# Patient Record
Sex: FEMALE | Race: WHITE | NOT HISPANIC OR LATINO | Employment: FULL TIME | URBAN - METROPOLITAN AREA
[De-identification: names, ages, dates, MRNs, and addresses within clinical notes are randomized per-mention and may not be internally consistent; named-entity substitution may affect disease eponyms.]

---

## 2022-08-10 ENCOUNTER — APPOINTMENT (OUTPATIENT)
Dept: RADIOLOGY | Facility: IMAGING CENTER | Age: 32
End: 2022-08-10
Attending: FAMILY MEDICINE
Payer: COMMERCIAL

## 2022-08-10 ENCOUNTER — OFFICE VISIT (OUTPATIENT)
Dept: URGENT CARE | Facility: PHYSICIAN GROUP | Age: 32
End: 2022-08-10
Payer: COMMERCIAL

## 2022-08-10 VITALS
DIASTOLIC BLOOD PRESSURE: 76 MMHG | BODY MASS INDEX: 35.98 KG/M2 | HEIGHT: 68 IN | HEART RATE: 80 BPM | SYSTOLIC BLOOD PRESSURE: 110 MMHG | RESPIRATION RATE: 16 BRPM | TEMPERATURE: 97 F | OXYGEN SATURATION: 97 % | WEIGHT: 237.4 LBS

## 2022-08-10 DIAGNOSIS — S92.415A CLOSED NONDISPLACED FRACTURE OF PROXIMAL PHALANX OF LEFT GREAT TOE, INITIAL ENCOUNTER: ICD-10-CM

## 2022-08-10 DIAGNOSIS — S99.922A INJURY OF LEFT FOOT, INITIAL ENCOUNTER: ICD-10-CM

## 2022-08-10 PROCEDURE — 99203 OFFICE O/P NEW LOW 30 MIN: CPT | Performed by: FAMILY MEDICINE

## 2022-08-10 PROCEDURE — 73630 X-RAY EXAM OF FOOT: CPT | Mod: TC,LT | Performed by: FAMILY MEDICINE

## 2022-08-10 ASSESSMENT — ENCOUNTER SYMPTOMS
ROS SKIN COMMENTS: NO ABRASION OR LACERATION
FOCAL WEAKNESS: 0
SENSORY CHANGE: 0

## 2022-08-10 NOTE — PROGRESS NOTES
"Subjective     Tari Pfeiffer is a 31 y.o. female who presents with Foot Injury (Fell downstairs this morning injured left foot, might have broken toes , hurting to walk)            Onset this morning injury to left foot.  Pain swelling and bruising great toe and second toe including MTP joints.  Unable to bear weight.  No prior injury.  No abrasion or laceration.  Minimal relief with OTC NSAID. Ice seems to be making the pain worse.  No other aggravating or alleviating factors.      Review of Systems   Musculoskeletal:         No ankle pain   Skin:         No abrasion or laceration     Neurological:  Negative for sensory change and focal weakness.            Objective     /76 (BP Location: Left arm, Patient Position: Sitting, BP Cuff Size: Adult)   Pulse 80   Temp 36.1 °C (97 °F) (Temporal)   Resp 16   Ht 1.727 m (5' 8\")   Wt 108 kg (237 lb 6.4 oz)   SpO2 97%   BMI 36.10 kg/m²      Physical Exam  Constitutional:       Appearance: Normal appearance.   Musculoskeletal:        Feet:    Skin:     General: Skin is warm and dry.   Neurological:      Mental Status: She is alert.                           Assessment & Plan     X-ray per radiology:  1.  Nondisplaced fracture of the distal aspect of the proximal phalanx of the first digit.  2.  Soft tissue swelling of the first digit.    1. Injury of left foot, initial encounter  DX-FOOT-COMPLETE 3+ LEFT      2. Closed nondisplaced fracture of proximal phalanx of left great toe, initial encounter  Referral to Sports Medicine        Differential diagnosis, natural history, supportive care, and indications for immediate follow-up discussed at length.     Walking boot placed.  Ice and NSAID as needed.  Follow-up with sports medicine for further evaluation and treatment.                 "
65

## 2022-08-10 NOTE — LETTER
August 10, 2022         Patient: Tari Pfeiffer   YOB: 1990   Date of Visit: 8/10/2022           To Whom it May Concern:    Tari Pfeiffer was seen in my clinic on 8/10/2022. Please excuse from work 8/10 through 8/16/2022.     Sincerely,           Panda Phan M.D.  Electronically Signed

## 2022-08-17 ENCOUNTER — OFFICE VISIT (OUTPATIENT)
Dept: SPORTS MEDICINE | Facility: CLINIC | Age: 32
End: 2022-08-17
Payer: COMMERCIAL

## 2022-08-17 ENCOUNTER — APPOINTMENT (OUTPATIENT)
Dept: RADIOLOGY | Facility: IMAGING CENTER | Age: 32
End: 2022-08-17
Attending: FAMILY MEDICINE
Payer: COMMERCIAL

## 2022-08-17 VITALS
TEMPERATURE: 98.2 F | HEART RATE: 60 BPM | SYSTOLIC BLOOD PRESSURE: 116 MMHG | BODY MASS INDEX: 35.92 KG/M2 | WEIGHT: 237 LBS | OXYGEN SATURATION: 98 % | HEIGHT: 68 IN | RESPIRATION RATE: 16 BRPM | DIASTOLIC BLOOD PRESSURE: 74 MMHG

## 2022-08-17 DIAGNOSIS — S92.415A CLOSED NONDISPLACED FRACTURE OF PROXIMAL PHALANX OF LEFT GREAT TOE, INITIAL ENCOUNTER: ICD-10-CM

## 2022-08-17 PROCEDURE — 73660 X-RAY EXAM OF TOE(S): CPT | Mod: TC,LT | Performed by: RADIOLOGY

## 2022-08-17 PROCEDURE — 99213 OFFICE O/P EST LOW 20 MIN: CPT | Performed by: FAMILY MEDICINE

## 2022-08-17 SDOH — HEALTH STABILITY: PHYSICAL HEALTH: ON AVERAGE, HOW MANY MINUTES DO YOU ENGAGE IN EXERCISE AT THIS LEVEL?: 40 MIN

## 2022-08-17 SDOH — ECONOMIC STABILITY: HOUSING INSECURITY
IN THE LAST 12 MONTHS, WAS THERE A TIME WHEN YOU DID NOT HAVE A STEADY PLACE TO SLEEP OR SLEPT IN A SHELTER (INCLUDING NOW)?: NO

## 2022-08-17 SDOH — ECONOMIC STABILITY: FOOD INSECURITY: WITHIN THE PAST 12 MONTHS, YOU WORRIED THAT YOUR FOOD WOULD RUN OUT BEFORE YOU GOT MONEY TO BUY MORE.: NEVER TRUE

## 2022-08-17 SDOH — ECONOMIC STABILITY: INCOME INSECURITY: HOW HARD IS IT FOR YOU TO PAY FOR THE VERY BASICS LIKE FOOD, HOUSING, MEDICAL CARE, AND HEATING?: NOT HARD AT ALL

## 2022-08-17 SDOH — ECONOMIC STABILITY: TRANSPORTATION INSECURITY
IN THE PAST 12 MONTHS, HAS THE LACK OF TRANSPORTATION KEPT YOU FROM MEDICAL APPOINTMENTS OR FROM GETTING MEDICATIONS?: NO

## 2022-08-17 SDOH — HEALTH STABILITY: PHYSICAL HEALTH: ON AVERAGE, HOW MANY DAYS PER WEEK DO YOU ENGAGE IN MODERATE TO STRENUOUS EXERCISE (LIKE A BRISK WALK)?: 4 DAYS

## 2022-08-17 SDOH — ECONOMIC STABILITY: HOUSING INSECURITY

## 2022-08-17 SDOH — ECONOMIC STABILITY: TRANSPORTATION INSECURITY
IN THE PAST 12 MONTHS, HAS LACK OF TRANSPORTATION KEPT YOU FROM MEETINGS, WORK, OR FROM GETTING THINGS NEEDED FOR DAILY LIVING?: NO

## 2022-08-17 SDOH — ECONOMIC STABILITY: TRANSPORTATION INSECURITY
IN THE PAST 12 MONTHS, HAS LACK OF RELIABLE TRANSPORTATION KEPT YOU FROM MEDICAL APPOINTMENTS, MEETINGS, WORK OR FROM GETTING THINGS NEEDED FOR DAILY LIVING?: NO

## 2022-08-17 SDOH — ECONOMIC STABILITY: INCOME INSECURITY: IN THE LAST 12 MONTHS, WAS THERE A TIME WHEN YOU WERE NOT ABLE TO PAY THE MORTGAGE OR RENT ON TIME?: NO

## 2022-08-17 SDOH — ECONOMIC STABILITY: FOOD INSECURITY: WITHIN THE PAST 12 MONTHS, THE FOOD YOU BOUGHT JUST DIDN'T LAST AND YOU DIDN'T HAVE MONEY TO GET MORE.: NEVER TRUE

## 2022-08-17 SDOH — HEALTH STABILITY: MENTAL HEALTH
STRESS IS WHEN SOMEONE FEELS TENSE, NERVOUS, ANXIOUS, OR CAN'T SLEEP AT NIGHT BECAUSE THEIR MIND IS TROUBLED. HOW STRESSED ARE YOU?: NOT AT ALL

## 2022-08-17 ASSESSMENT — SOCIAL DETERMINANTS OF HEALTH (SDOH)
HOW OFTEN DO YOU GET TOGETHER WITH FRIENDS OR RELATIVES?: ONCE A WEEK
ARE YOU MARRIED, WIDOWED, DIVORCED, SEPARATED, NEVER MARRIED, OR LIVING WITH A PARTNER?: NEVER MARRIED
DO YOU BELONG TO ANY CLUBS OR ORGANIZATIONS SUCH AS CHURCH GROUPS UNIONS, FRATERNAL OR ATHLETIC GROUPS, OR SCHOOL GROUPS?: YES
ARE YOU MARRIED, WIDOWED, DIVORCED, SEPARATED, NEVER MARRIED, OR LIVING WITH A PARTNER?: NEVER MARRIED
HOW OFTEN DO YOU HAVE A DRINK CONTAINING ALCOHOL: NEVER
HOW OFTEN DO YOU ATTENT MEETINGS OF THE CLUB OR ORGANIZATION YOU BELONG TO?: MORE THAN 4 TIMES PER YEAR
HOW OFTEN DO YOU HAVE SIX OR MORE DRINKS ON ONE OCCASION: NEVER
HOW MANY DRINKS CONTAINING ALCOHOL DO YOU HAVE ON A TYPICAL DAY WHEN YOU ARE DRINKING: PATIENT DOES NOT DRINK
HOW OFTEN DO YOU ATTEND CHURCH OR RELIGIOUS SERVICES?: NEVER
HOW OFTEN DO YOU ATTEND CHURCH OR RELIGIOUS SERVICES?: NEVER
IN A TYPICAL WEEK, HOW MANY TIMES DO YOU TALK ON THE PHONE WITH FAMILY, FRIENDS, OR NEIGHBORS?: MORE THAN THREE TIMES A WEEK
IN A TYPICAL WEEK, HOW MANY TIMES DO YOU TALK ON THE PHONE WITH FAMILY, FRIENDS, OR NEIGHBORS?: MORE THAN THREE TIMES A WEEK
HOW HARD IS IT FOR YOU TO PAY FOR THE VERY BASICS LIKE FOOD, HOUSING, MEDICAL CARE, AND HEATING?: NOT HARD AT ALL
HOW OFTEN DO YOU ATTENT MEETINGS OF THE CLUB OR ORGANIZATION YOU BELONG TO?: MORE THAN 4 TIMES PER YEAR
WITHIN THE PAST 12 MONTHS, YOU WORRIED THAT YOUR FOOD WOULD RUN OUT BEFORE YOU GOT THE MONEY TO BUY MORE: NEVER TRUE
DO YOU BELONG TO ANY CLUBS OR ORGANIZATIONS SUCH AS CHURCH GROUPS UNIONS, FRATERNAL OR ATHLETIC GROUPS, OR SCHOOL GROUPS?: YES
HOW OFTEN DO YOU GET TOGETHER WITH FRIENDS OR RELATIVES?: ONCE A WEEK

## 2022-08-17 ASSESSMENT — ENCOUNTER SYMPTOMS
FEVER: 0
SENSORY CHANGE: 0

## 2022-08-17 ASSESSMENT — LIFESTYLE VARIABLES
HOW OFTEN DO YOU HAVE SIX OR MORE DRINKS ON ONE OCCASION: NEVER
HOW OFTEN DO YOU HAVE A DRINK CONTAINING ALCOHOL: NEVER
SKIP TO QUESTIONS 9-10: 1
HOW MANY STANDARD DRINKS CONTAINING ALCOHOL DO YOU HAVE ON A TYPICAL DAY: PATIENT DOES NOT DRINK
AUDIT-C TOTAL SCORE: 0

## 2022-08-17 NOTE — LETTER
August 17, 2022    To Whom It May Concern:         This is confirmation that Tari Pfeiffer attended her scheduled appointment with Diaz Vasquez M.D. on 8/17/22.  She is recovering from a left foot injury.  She will be walking in a walking boot for the next 3 weeks (possibly up to 5 more weeks) in order to heal this injury.  She is able to work, stand, walk without restriction.  She cannot do any heavy lifting >25lbs while in the walking boot.  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  No lifting of patients allowed.  Thank you for making accommodations as she recovers.         If you have any questions please do not hesitate to call me at the phone number listed below.    Sincerely,          Diaz Vasquez M.D.  [phone]

## 2022-08-17 NOTE — PROGRESS NOTES
"Subjective:     Tari Pfeiffer is a 31 y.o. female who presents for Toe Injury (Left foot, big toe injury. )    HPI  Pt presents for evaluation of left great toe injury  Patient with left great toe proximal phalanx fracture on 8/10/2022  Patient with a fall downstairs  Seen in urgent care same day and had x-rays  Placed into a walking boot at that time  Unfortunately, patient feels that the walking boot is too big and has not been wearing it all the time  Has been ambulating in Birkenstocks  Toe pain is improving some  Still has swelling and bruising in the toe  No new falls or injuries since initial accident    Review of Systems   Constitutional:  Negative for fever.   Skin:  Positive for rash (Bruising).   Neurological:  Negative for sensory change.     PMH:  has no past medical history on file.  MEDS:   Current Outpatient Medications:     etonogestrel (NEXPLANON) 68 MG Implant implant, Inject 1 Each under the skin., Disp: , Rfl:   ALLERGIES:   Allergies   Allergen Reactions    Penicillamine     Penicillins      RASH    Cefaclor Rash     RASH  RASH      Codeine Hives and Rash     RASH  RASH       SURGHX: History reviewed. No pertinent surgical history.  SOCHX:       Objective:   /74 (BP Location: Left arm, Patient Position: Sitting, BP Cuff Size: Large adult)   Pulse 60   Temp 36.8 °C (98.2 °F) (Temporal)   Resp 16   Ht 1.727 m (5' 8\")   Wt 108 kg (237 lb)   SpO2 98%   BMI 36.04 kg/m²     Physical Exam  Constitutional:       General: She is not in acute distress.     Appearance: She is well-developed. She is not diaphoretic.   Pulmonary:      Effort: Pulmonary effort is normal.   Neurological:      Mental Status: She is alert.   Tenderness to palpation focally in the left great toe between MTP joint and IPJ.  Mild bruising and swelling throughout left great toe.    Assessment/Plan:   Assessment    1. Closed nondisplaced fracture of proximal phalanx of left great toe, initial encounter  - DX-TOE(S) 2+ " LEFT; Future    Patient with closed fracture of the left great proximal phalanx.  Boot was switched in office today and new boot appears to be much better fitting.  Note for work given.  Patient will remain in this walking boot for a minimum 4 weeks total and advised that she may take 6 weeks to finally be out of the boot.  Emphasized importance of always being in the boot rather than ambulating in regular shoes or barefoot.  Patient is agreeable and will follow-up at 4 weeks to evaluate if she is ready to start weaning out of the boot.

## 2022-09-07 ENCOUNTER — APPOINTMENT (OUTPATIENT)
Dept: RADIOLOGY | Facility: IMAGING CENTER | Age: 32
End: 2022-09-07
Attending: FAMILY MEDICINE
Payer: COMMERCIAL

## 2022-09-07 ENCOUNTER — OFFICE VISIT (OUTPATIENT)
Dept: SPORTS MEDICINE | Facility: CLINIC | Age: 32
End: 2022-09-07
Payer: COMMERCIAL

## 2022-09-07 VITALS
TEMPERATURE: 98.1 F | BODY MASS INDEX: 35.92 KG/M2 | RESPIRATION RATE: 18 BRPM | DIASTOLIC BLOOD PRESSURE: 80 MMHG | WEIGHT: 237 LBS | OXYGEN SATURATION: 96 % | HEART RATE: 87 BPM | HEIGHT: 68 IN | SYSTOLIC BLOOD PRESSURE: 122 MMHG

## 2022-09-07 DIAGNOSIS — S92.415A CLOSED NONDISPLACED FRACTURE OF PROXIMAL PHALANX OF LEFT GREAT TOE, INITIAL ENCOUNTER: ICD-10-CM

## 2022-09-07 PROCEDURE — 99213 OFFICE O/P EST LOW 20 MIN: CPT | Performed by: FAMILY MEDICINE

## 2022-09-07 ASSESSMENT — ENCOUNTER SYMPTOMS
FEVER: 0
SENSORY CHANGE: 0
TINGLING: 0

## 2022-09-07 NOTE — LETTER
September 7, 2022    To Whom It May Concern:         This is confirmation that Tari Pfeiffer attended her scheduled appointment with Diaz Vasquez M.D. on 9/07/22.  She is healing well from her injury.  She is cleared to return to work starting 9/16/22.  Thank you for your patience as she recovers.           If you have any questions please do not hesitate to call me at the phone number listed below.    Sincerely,          Diaz Vasquez M.D.  [phone]

## 2022-09-07 NOTE — PROGRESS NOTES
"Subjective:     Tari Pfeiffer is a 31 y.o. female who presents for Toe Injury (F/V L great toe injury )      HPI  Pt presents for follow-up  Patient with left great toe proximal phalanx fracture after an injury 4 weeks ago  Has been in walking boot for 4 weeks  Has been pain-free for over a week  Always walking in boot and feels she is doing very well  Does sleep without the boot  No repeat falls or injuries since last visit  Takes multivitamin every day, but unsure if there is a calcium and vitamin D and it    Review of Systems   Constitutional:  Negative for fever.   Neurological:  Negative for tingling and sensory change.     PMH:  has no past medical history on file.  MEDS:   Current Outpatient Medications:     etonogestrel (NEXPLANON) 68 MG Implant implant, Inject 1 Each under the skin., Disp: , Rfl:   ALLERGIES:   Allergies   Allergen Reactions    Penicillamine     Penicillins      RASH    Cefaclor Rash     RASH  RASH      Codeine Hives and Rash     RASH  RASH       SURGHX: No past surgical history on file.  SOCHX:  reports that she has never smoked. She has never used smokeless tobacco.     Objective:   /80 (BP Location: Left arm, Patient Position: Sitting, BP Cuff Size: Large adult)   Pulse 87   Temp 36.7 °C (98.1 °F) (Temporal)   Resp 18   Ht 1.727 m (5' 8\")   Wt 108 kg (237 lb)   SpO2 96%   BMI 36.04 kg/m²     Physical Exam  Constitutional:       General: She is not in acute distress.     Appearance: She is well-developed. She is not diaphoretic.   Pulmonary:      Effort: Pulmonary effort is normal.   Neurological:      Mental Status: She is alert.   Left ankle/foot:  Appearance: +Slight bruising over dorsal first toe  Palpation: No TTP throughout great toe or dorsal midfoot  Neurovascular: Capillary refill less than 2 seconds.  Sensation intact   Gait: Nonantalgic in boot      Assessment/Plan:   Assessment    1. Closed nondisplaced fracture of proximal phalanx of left great toe, initial " encounter  - DX-TOE(S) 2+ LEFT; Future    Patient with nondisplaced fracture of the left first proximal phalanx.  She is healing very well and currently pain-free on exam.  Will start weaning out of boot and into regular stiff shoe.  Advised to return to boot if there is any pain at all.  We will plan to repeat x-ray once she has been walking in the shoe for about 2 weeks to ensure healing.  Work note given and will follow-up x-ray results.

## 2023-02-24 ENCOUNTER — HOSPITAL ENCOUNTER (OUTPATIENT)
Facility: MEDICAL CENTER | Age: 33
End: 2023-02-24
Attending: OBSTETRICS & GYNECOLOGY
Payer: COMMERCIAL

## 2023-02-24 ENCOUNTER — GYNECOLOGY VISIT (OUTPATIENT)
Dept: OBGYN | Facility: CLINIC | Age: 33
End: 2023-02-24
Payer: COMMERCIAL

## 2023-02-24 VITALS
SYSTOLIC BLOOD PRESSURE: 113 MMHG | HEIGHT: 68 IN | DIASTOLIC BLOOD PRESSURE: 81 MMHG | BODY MASS INDEX: 36.98 KG/M2 | WEIGHT: 244 LBS

## 2023-02-24 DIAGNOSIS — Z12.4 SCREENING FOR MALIGNANT NEOPLASM OF CERVIX: ICD-10-CM

## 2023-02-24 DIAGNOSIS — Z01.419 WOMEN'S ANNUAL ROUTINE GYNECOLOGICAL EXAMINATION: Primary | ICD-10-CM

## 2023-02-24 PROCEDURE — 87491 CHLMYD TRACH DNA AMP PROBE: CPT

## 2023-02-24 PROCEDURE — 87591 N.GONORRHOEAE DNA AMP PROB: CPT

## 2023-02-24 PROCEDURE — 88175 CYTOPATH C/V AUTO FLUID REDO: CPT

## 2023-02-24 PROCEDURE — 87624 HPV HI-RISK TYP POOLED RSLT: CPT

## 2023-02-24 PROCEDURE — 99385 PREV VISIT NEW AGE 18-39: CPT | Performed by: OBSTETRICS & GYNECOLOGY

## 2023-02-24 NOTE — PROGRESS NOTES
ANNUAL GYNECOLOGY VISIT    Chief Complaint  Annual Exam      Subjective  Tari Pfeiffer is a 32 y.o. female who presents today for new patient Annual Exam.  She states she has only had about 1 pap smear in her life probably 10 years ago.  She states she didn't take care of herself in her 20s (secondary to alcoholism) but has been sober since 2019 and is trying to establish regular medical care. She had multiple sexual partners in those years but none currently.  She recently had serum STD testing and is amenable to routine vaginal STD testing today but has no concerns in this regard.      She currently has nexplanon for contraception placed in 2021.  She has had nexplanons since an unplanned pregnancy (and ) in  and is overall happy with it as a contraceptive method.      Preventive Care   Immunization History   Administered Date(s) Administered    DTP - Historical vaccine 1993, 1993, 1993    Dtap Vaccine 1994, 10/14/1997    HPV Quadrivalent Vaccine (GARDASIL) - HISTORICAL DATA 2007, 2007, 08/15/2007    Hepatitis A Vaccine, Ped/Adol 2000, 2002    Hepatitis B Vaccine Adolescent/Pediatric 1992, 1992, 1993    Hib Vaccine (HbOC) - HISTORICAL DATA 1993, 1993, 1993, 01/10/1994    Influenza (IM) Preservative Free - HISTORICAL DATA 2016    Influenza Vaccine Pediatric Split - Historical Data 10/18/1995, 10/14/1997, 10/22/1998, 10/06/1999, 2001, 2004    MMR Vaccine 01/10/1994, 10/14/1997    Meningococcal Conjugate Vaccine MCV4 (Menactra) 2009    OPV TRIVALENT - HISTORICAL DATA (GIVEN PRIOR TO MAY 2016) 1993, 1993, 1994, 10/14/1997    PFIZER PURPLE CAP SARS-COV-2 VACCINATION (12+) 2021, 2021    Tdap Vaccine 2007, 08/15/2016    Tuberculin Skin Test 2018, 2020     Last Mammogram: n/a    Gynecology History and ROS  Current Sexual Activity: No  History of  sexually transmitted diseases?  no  Abnormal discharge? No  Current Contraception:  nexplanon    Menstrual History  No LMP recorded.  Periods are irregular secondary to implant      Pap History  Last pap smear: 2013  History of moderate or severe dysplasia: No    Cancer Risk Assessement:  Family history of:   - Breast cancer: no   - Ovarian cancer: no   - Uterine cancer: no   - Colon cancer: no    Obstetric History  OB History    Para Term  AB Living   1       1     SAB IAB Ectopic Molar Multiple Live Births                    # Outcome Date GA Lbr Bowen/2nd Weight Sex Delivery Anes PTL Lv   1 AB 2012               Past Medical History  History reviewed. No pertinent past medical history.    Past Surgical History  History reviewed. No pertinent surgical history.    Social History  Social History     Socioeconomic History    Marital status: Single     Spouse name: Not on file    Number of children: Not on file    Years of education: Not on file    Highest education level: Associate degree: occupational, technical, or vocational program   Occupational History    Not on file   Tobacco Use    Smoking status: Never    Smokeless tobacco: Never   Vaping Use    Vaping Use: Never used   Substance and Sexual Activity    Alcohol use: Yes    Drug use: Never    Sexual activity: Not Currently     Partners: Male, Female     Birth control/protection: Implant   Other Topics Concern    Not on file   Social History Narrative    Not on file     Social Determinants of Health     Financial Resource Strain: Low Risk     Difficulty of Paying Living Expenses: Not hard at all   Food Insecurity: No Food Insecurity    Worried About Running Out of Food in the Last Year: Never true    Ran Out of Food in the Last Year: Never true   Transportation Needs: No Transportation Needs    Lack of Transportation (Medical): No    Lack of Transportation (Non-Medical): No   Physical Activity: Sufficiently Active    Days of Exercise per Week: 4  days    Minutes of Exercise per Session: 40 min   Stress: No Stress Concern Present    Feeling of Stress : Not at all   Social Connections: Moderately Isolated    Frequency of Communication with Friends and Family: More than three times a week    Frequency of Social Gatherings with Friends and Family: Once a week    Attends Sikhism Services: Never    Active Member of Clubs or Organizations: Yes    Attends Club or Organization Meetings: More than 4 times per year    Marital Status: Never    Intimate Partner Violence: Not on file   Housing Stability: Unknown    Unable to Pay for Housing in the Last Year: No    Number of Places Lived in the Last Year: Not on file    Unstable Housing in the Last Year: No       Family History  Family History   Problem Relation Age of Onset    Hyperlipidemia Mother     Hypertension Mother     Hyperlipidemia Father     Hypertension Father        Home Medications  Current Outpatient Medications on File Prior to Visit   Medication Sig Dispense Refill    etonogestrel (NEXPLANON) 68 MG Implant implant Inject 1 Each under the skin.       No current facility-administered medications on file prior to visit.       Allergies/Reactions  Allergies   Allergen Reactions    Penicillamine     Penicillins      RASH    Cefaclor Rash     RASH  RASH      Codeine Hives and Rash     RASH  RASH         ROS  Positive ROS: none  Gen: no fevers or chills, no significant weight loss or gain, excessive fatigue  Respiratory:  no cough or dyspnea  Cardiac:  no chest pain, no palpitations, no syncope  Breast: no breast discharge, pain, lump or skin changes  GI:  no heartburn, no abdominal pain, no nausea or vomiting  Urinary: no dysuria, urgency, frequency, incontinence   Psych: no depression or anxiety  Neuro: no migraines with aura, fainting spells, numbness or tingling  Extremities: no joint pain, persistently swollen ankles, recurrent leg cramps      Physical Examination:  Vital Signs:   Vitals:     "02/24/23 1151   BP: 113/81   BP Location: Right arm   Patient Position: Sitting   BP Cuff Size: Large adult   Weight: 244 lb   Height: 5' 8\"     Body mass index is 37.1 kg/m².    Constitutional: The patient is well developed and well nourished.  Psychiatric: Patient is oriented to time place and person.   Skin: No rash observed.  Neck: Appears symmetric. Thyroid normal size  Respiratory: normal effort  Breast: Inspection reveals no asymetry or nipple discharge, no skin thickening, dimpling or erythema.  Palpation demonstrates no masses.  Abdomen: Soft, non-tender.  Pelvic Exam:     Vulva: external female genitalia are normal in appearance. No lesions    Urethra - no lesions, no erythema    Vagina: moist, pink, normal ruggae    Cervix: pink, smooth, no lesions, no CMT    Uterus - non-tender, normal size, shape, contour, mobile    Ovaries: non-tender, no appreciable masses  Pap Smear performed: Yes  Extremeties: Legs are symmetric and without tenderness. There is no edema present.    Labs/Imaging:  No results found for: HDL, LDL  No components found for: A1C1  No results found for: WBC, HGB, HCT  No results found for: NA, K, CL, CO2, BUN  [unfilled]      Assessment & Plan  Tari Pfeiffer is a 32 y.o. female who presents today for Annual Gyn Exam.     1. Women's annual routine gynecological examination  Breast and pelvic exam completed  Pap with cotest done  Advised on breast self awareness  Advised on flu and/or covid vaccines in season  Advised on calcium and vit D intake        2. Screening for malignant neoplasm of cervix  - THINPREP PAP W/HPV AND CTNG; Future        Return: Annually or PRN    Korin Iyer D.O.        "

## 2023-02-27 LAB
C TRACH DNA GENITAL QL NAA+PROBE: NEGATIVE
CYTOLOGY REG CYTOL: NORMAL
HPV HR 12 DNA CVX QL NAA+PROBE: NEGATIVE
HPV16 DNA SPEC QL NAA+PROBE: NEGATIVE
HPV18 DNA SPEC QL NAA+PROBE: NEGATIVE
N GONORRHOEA DNA GENITAL QL NAA+PROBE: NEGATIVE
SPECIMEN SOURCE: NORMAL
SPECIMEN SOURCE: NORMAL

## 2023-04-19 ENCOUNTER — APPOINTMENT (RX ONLY)
Dept: URBAN - METROPOLITAN AREA CLINIC 6 | Facility: CLINIC | Age: 33
Setting detail: DERMATOLOGY
End: 2023-04-19

## 2023-04-19 DIAGNOSIS — D22 MELANOCYTIC NEVI: ICD-10-CM

## 2023-04-19 DIAGNOSIS — Z71.89 OTHER SPECIFIED COUNSELING: ICD-10-CM

## 2023-04-19 DIAGNOSIS — L85.3 XEROSIS CUTIS: ICD-10-CM

## 2023-04-19 DIAGNOSIS — L81.4 OTHER MELANIN HYPERPIGMENTATION: ICD-10-CM

## 2023-04-19 PROBLEM — D22.62 MELANOCYTIC NEVI OF LEFT UPPER LIMB, INCLUDING SHOULDER: Status: ACTIVE | Noted: 2023-04-19

## 2023-04-19 PROBLEM — D22.72 MELANOCYTIC NEVI OF LEFT LOWER LIMB, INCLUDING HIP: Status: ACTIVE | Noted: 2023-04-19

## 2023-04-19 PROBLEM — D22.5 MELANOCYTIC NEVI OF TRUNK: Status: ACTIVE | Noted: 2023-04-19

## 2023-04-19 PROBLEM — D22.61 MELANOCYTIC NEVI OF RIGHT UPPER LIMB, INCLUDING SHOULDER: Status: ACTIVE | Noted: 2023-04-19

## 2023-04-19 PROBLEM — D22.71 MELANOCYTIC NEVI OF RIGHT LOWER LIMB, INCLUDING HIP: Status: ACTIVE | Noted: 2023-04-19

## 2023-04-19 PROCEDURE — 99203 OFFICE O/P NEW LOW 30 MIN: CPT

## 2023-04-19 PROCEDURE — ? COUNSELING

## 2023-04-19 ASSESSMENT — LOCATION DETAILED DESCRIPTION DERM
LOCATION DETAILED: LEFT INFERIOR MEDIAL UPPER BACK
LOCATION DETAILED: LEFT PROXIMAL DORSAL FOREARM
LOCATION DETAILED: RIGHT DISTAL POSTERIOR THIGH
LOCATION DETAILED: RIGHT MID-UPPER BACK
LOCATION DETAILED: RIGHT VENTRAL DISTAL FOREARM
LOCATION DETAILED: LEFT CENTRAL MALAR CHEEK
LOCATION DETAILED: RIGHT PROXIMAL DORSAL FOREARM
LOCATION DETAILED: LEFT PROXIMAL CALF
LOCATION DETAILED: LEFT VENTRAL DISTAL FOREARM
LOCATION DETAILED: RIGHT PROXIMAL CALF
LOCATION DETAILED: RIGHT SUPERIOR LATERAL MIDBACK
LOCATION DETAILED: LEFT DISTAL POSTERIOR THIGH

## 2023-04-19 ASSESSMENT — LOCATION SIMPLE DESCRIPTION DERM
LOCATION SIMPLE: LEFT CALF
LOCATION SIMPLE: LEFT FOREARM
LOCATION SIMPLE: RIGHT POSTERIOR THIGH
LOCATION SIMPLE: RIGHT FOREARM
LOCATION SIMPLE: RIGHT UPPER BACK
LOCATION SIMPLE: LEFT POSTERIOR THIGH
LOCATION SIMPLE: RIGHT CALF
LOCATION SIMPLE: LEFT CHEEK
LOCATION SIMPLE: LEFT UPPER BACK
LOCATION SIMPLE: RIGHT LOWER BACK

## 2023-04-19 ASSESSMENT — LOCATION ZONE DERM
LOCATION ZONE: TRUNK
LOCATION ZONE: LEG
LOCATION ZONE: ARM
LOCATION ZONE: FACE

## 2023-06-19 ENCOUNTER — GYNECOLOGY VISIT (OUTPATIENT)
Dept: OBGYN | Facility: CLINIC | Age: 33
End: 2023-06-19
Payer: COMMERCIAL

## 2023-06-19 VITALS
SYSTOLIC BLOOD PRESSURE: 122 MMHG | BODY MASS INDEX: 37.57 KG/M2 | WEIGHT: 247.9 LBS | DIASTOLIC BLOOD PRESSURE: 79 MMHG | HEIGHT: 68 IN

## 2023-06-19 DIAGNOSIS — Z30.46 NEXPLANON REMOVAL: ICD-10-CM

## 2023-06-19 PROCEDURE — 3074F SYST BP LT 130 MM HG: CPT | Performed by: OBSTETRICS & GYNECOLOGY

## 2023-06-19 PROCEDURE — 11982 REMOVE DRUG IMPLANT DEVICE: CPT | Performed by: OBSTETRICS & GYNECOLOGY

## 2023-06-19 PROCEDURE — 3078F DIAST BP <80 MM HG: CPT | Performed by: OBSTETRICS & GYNECOLOGY

## 2023-06-19 NOTE — PROGRESS NOTES
"Nexplanon Removal Procedure Note    Patient presents for removal of Nexplanon. This is her 3rd nexplanon and has been associated with tons of heavy and irregular bleeding so she would like to discontinue it altogether. She does not want alternative birth control at this time and is ok if pregnancy occurs.  Procedure discussed at length with patient, including use of local anesthetic.  Implant is in the patient's left upper arm.      Vitals:    06/19/23 1415   BP: 122/79   BP Location: Right arm   Patient Position: Sitting   BP Cuff Size: Adult   Weight: 247 lb 14.4 oz   Height: 5' 8\"       The patient was positioned on the examination table with her left arm flexed at the elbow and externally rotated so that her wrist was parallel to her ear.  The nexplanon was easily palpated along the medial aspect of the left upper arm and felt to be superficial.  The skin over the distal end of the implant was cleansed with betadine and 3mL of 1% lidocaine with epinephrine was injected subcutaneously and an additional 1.5mL of lidocaine was later added for additional analgesia.    A small incision was made and the implant was removed with mosquito forceps after prolonged attempt.  Incision had to be extended slightly but the procedure was successfully accomplished. 4-0 vicryl was used to close the now circular incision.  Minimal blood loss.  A bandage was placed over the removal site.  Patient tolerated the procedure well.     Korin Iyer D.O.    "

## 2023-09-05 ENCOUNTER — APPOINTMENT (OUTPATIENT)
Dept: RADIOLOGY | Facility: MEDICAL CENTER | Age: 33
End: 2023-09-05
Attending: OTOLARYNGOLOGY
Payer: COMMERCIAL

## 2023-09-05 DIAGNOSIS — H55.09 NYSTAGMUS ASSOCIATED WITH DISORDERS OF THE VESTIBULAR SYSTEM: ICD-10-CM

## 2023-09-05 DIAGNOSIS — H81.90 NYSTAGMUS ASSOCIATED WITH DISORDERS OF THE VESTIBULAR SYSTEM: ICD-10-CM

## 2023-09-05 DIAGNOSIS — H90.42 SENSORINEURAL HEARING LOSS, UNILATERAL, LEFT EAR, WITH UNRESTRICTED HEARING ON THE CONTRALATERAL SIDE: ICD-10-CM

## 2023-09-05 DIAGNOSIS — D33.3 BENIGN NEOPLASM OF CRANIAL NERVES (HCC): ICD-10-CM

## 2023-09-05 PROCEDURE — 70553 MRI BRAIN STEM W/O & W/DYE: CPT

## 2023-09-05 PROCEDURE — 700117 HCHG RX CONTRAST REV CODE 255: Performed by: OTOLARYNGOLOGY

## 2023-09-05 PROCEDURE — A9579 GAD-BASE MR CONTRAST NOS,1ML: HCPCS | Performed by: OTOLARYNGOLOGY

## 2023-09-05 RX ADMIN — GADOTERIDOL 20 ML: 279.3 INJECTION, SOLUTION INTRAVENOUS at 09:03

## 2023-12-12 ENCOUNTER — OFFICE VISIT (OUTPATIENT)
Dept: NEUROLOGY | Facility: MEDICAL CENTER | Age: 33
End: 2023-12-12
Attending: PSYCHIATRY & NEUROLOGY
Payer: COMMERCIAL

## 2023-12-12 ENCOUNTER — TELEPHONE (OUTPATIENT)
Dept: NEUROLOGY | Facility: MEDICAL CENTER | Age: 33
End: 2023-12-12

## 2023-12-12 VITALS
WEIGHT: 244.49 LBS | HEIGHT: 68 IN | BODY MASS INDEX: 37.05 KG/M2 | SYSTOLIC BLOOD PRESSURE: 128 MMHG | HEART RATE: 74 BPM | OXYGEN SATURATION: 99 % | TEMPERATURE: 97.3 F | DIASTOLIC BLOOD PRESSURE: 68 MMHG

## 2023-12-12 DIAGNOSIS — G43.109 MIGRAINE WITH AURA AND WITHOUT STATUS MIGRAINOSUS, NOT INTRACTABLE: ICD-10-CM

## 2023-12-12 DIAGNOSIS — D36.10 SCHWANNOMA: ICD-10-CM

## 2023-12-12 PROCEDURE — 3078F DIAST BP <80 MM HG: CPT | Performed by: PSYCHIATRY & NEUROLOGY

## 2023-12-12 PROCEDURE — 99211 OFF/OP EST MAY X REQ PHY/QHP: CPT | Performed by: PSYCHIATRY & NEUROLOGY

## 2023-12-12 PROCEDURE — 99204 OFFICE O/P NEW MOD 45 MIN: CPT | Performed by: PSYCHIATRY & NEUROLOGY

## 2023-12-12 PROCEDURE — 3074F SYST BP LT 130 MM HG: CPT | Performed by: PSYCHIATRY & NEUROLOGY

## 2023-12-12 RX ORDER — TOPIRAMATE 50 MG/1
50 TABLET, FILM COATED ORAL DAILY
Qty: 30 TABLET | Refills: 4 | Status: SHIPPED | OUTPATIENT
Start: 2023-12-12 | End: 2024-02-20 | Stop reason: SDUPTHER

## 2023-12-12 RX ORDER — SUMATRIPTAN 25 MG/1
25 TABLET, FILM COATED ORAL
COMMUNITY
Start: 2023-11-13 | End: 2023-12-12 | Stop reason: SDUPTHER

## 2023-12-12 RX ORDER — ONDANSETRON 4 MG/1
4 TABLET, ORALLY DISINTEGRATING ORAL EVERY 6 HOURS PRN
COMMUNITY

## 2023-12-12 RX ORDER — IBUPROFEN 600 MG/1
600 TABLET ORAL
COMMUNITY

## 2023-12-12 RX ORDER — SUMATRIPTAN 25 MG/1
25 TABLET, FILM COATED ORAL PRN
Qty: 10 TABLET | Refills: 3 | Status: SHIPPED | OUTPATIENT
Start: 2023-12-12

## 2023-12-12 RX ORDER — ACETAMINOPHEN 500 MG
1000 TABLET ORAL
COMMUNITY

## 2023-12-12 ASSESSMENT — PATIENT HEALTH QUESTIONNAIRE - PHQ9: CLINICAL INTERPRETATION OF PHQ2 SCORE: 0

## 2023-12-12 NOTE — PATIENT INSTRUCTIONS
Please keep the diary of your headaches.    Please keep the diary of provoking factors for your headaches (e.g. alcohol, certain foods, etc).     Please take riboflavin (vitamin B2) 400 mg daily and magnesium oxide 400 mg daily for headache prevention (over-the-counter).    Please take Topamax 25 mg daily (before sleep) for 2 weeks and then increase to 50 mg daily (before sleep) and continue this dose).     Please let our office know if you experience any changes in your neurological status and/or any changes in the nature of your headaches.     Please note that you must not get pregnant while taking headache preventative medications.     Please seek emergent medical attention if you experience any new/worsening neck/back pain, weakness/numbness, bladder/bowel incontinence, balance/walking difficulties, and/or any new/worsening neurological problem(s).     Please ask your primary care physician to check your vitamin B12, folate, TSH, CBC, CMP, copper, and zinc.

## 2023-12-12 NOTE — TELEPHONE ENCOUNTER
Received Refill PA request via [facility]  for Topiramate (Topamax) 50 MG Tabs. (Quantity:30, Day Supply:30) - Copay $8.00 Max 30 DS     Insurance: [employer]  Member ID:  [id number]  BIN: [id number]  PCN: [id number]  Group: [id number]     Ran Test claim via [facility] medication Pays for a $8.00 copay. Will outreach to patient to offer specialty pharmacy services and or release to preferred pharmacy

## 2023-12-12 NOTE — PROGRESS NOTES
[facility] Headache Program  New Patient Visit      Patient's Name: Tari Pfeiffer  YOB: 1990  MRN: [id number]  Date of Service: 12/12/23    Referring Provider: No referring provider defined for this encounter.    Chief Concern: Headaches.     The patient presents with the significant other and provides verbal consent for him to be present and provide additional history as necessary.     HPI: The patient is a 33 y.o., right-handed female, who initially presented to my headache clinic for evaluation of headaches on 12/12/2023.    The patient experienced several migraine headaches in a row during her 20s.  These headaches would start with visual changes, described as black spots while looking at the screen, followed by a headache.  After the cluster of headaches, she had none since early to mid 2023.    In early to mid 2023, the patient started experiencing sensation that her eyes want to go back, strange feeling around her head, along with nausea and dizziness, followed by bitemporal/frontal tension headache with movement making it worse.  She has 6-8 of these headaches per month.  Sumatriptan helps these headaches.    She has another type of head pain, which is described as left-sided facial/head pain, described as searing/hot burning pain, that started in 2023 as well, and led to the diagnosis of schwannoma.  This pain lasted for several hours.  In addition she now has daily burning sensation that comes and goes, typically last around 2 hours, and is described as burning sensation around her left upper lip, and around her left eye.  Sumatriptan does not help these headaches.    The patient also reports that since 2019 she has difficulties with balance and her eyes are closed.  She had no falls.  She has no bowel or bladder incontinence.  She has some musculoskeletal neck pain, which gets better with chiropractic treatment.    Pertinent Ancillary Test Results:    MRI brain studies:   - MRI  "brain wo/w contrast (09/05/2023 at [facility]): \"IMPRESSION: 1.  Left acoustic neuroma with morphological characteristics as described above. 2.  A few punctate nonspecific foci of white matter T2 hyperintensities likely representing nonspecific foci of gliosis of no clinical significance.\"    Current Acute Headache Treatment Medications: Tylenol. Ibuprofen. Sumatriptan (works for her typical migraine, not searing headaches/burning sensation on the left side of her head).     Previously Acute Headache Treatment Medications: CBD (helped with sleep).     Current Headache Preventative Medications: None.     Previously Headache Preventative Medications: Gabapentin (made her feel worse).     Review of Systems: No recent fevers. No recent significant weight changes. The mood is overall stable. No SI/HI.     Past Medical History:  Migraine headache. Left schwannoma.     Past Surgical History:  None except wisdom teeth removal.      Social History:  Social History     Tobacco Use    Smoking status: Never    Smokeless tobacco: Never   Vaping Use    Vaping Use: Never used   Substance Use Topics    Alcohol use: Yes    Drug use: Never     Family History:  Her sister has migraines.   Family History   Problem Relation Age of Onset    Hyperlipidemia Mother     Hypertension Mother     Hyperlipidemia Father     Hypertension Father      [place] Suicide Severity Rating Scale     Wish to be Dead?: No  Suicidal Thoughts: No    Suicidal Thoughts with Method Without Specific Plan or Intent to Act:    Suicidal Intent Without Specific Plan:    Suicide Intent with Specific Plan:    Suicide Behavior Question: No  How long ago did you do any of these?:    C-SSRS Risk Level: No Risk    Additional Suicide Screening Questions    Suspected or Confirmed Suicide Attempted?: No  Harming or killing others?: No    Allergies:  Allergies   Allergen Reactions    Penicillamine     Penicillins      RASH    Cefaclor Rash     RASH  RASH   " "   Codeine Hives and Rash     RASH  RASH       Current Medications:    Current Outpatient Medications:     acetaminophen, Take 1,000 mg by mouth., Taking    ibuprofen, Take 600 mg by mouth., Taking    SUMAtriptan, Take 25 mg by mouth., Taking    ondansetron, 4 mg, Oral, Q6HRS PRN, Taking    etonogestrel, Inject 1 Each under the skin.    Physical Examination:    Ambulatory Vitals  Encounter Vitals  Temperature: 36.3 °C (97.3 °F)  Temp src: Temporal  Blood Pressure: 128/68  Pulse: 74  Pulse Oximetry: 99 %  Weight: 111 kg (244 lb 7.8 oz)  Height: 172.7 cm (5' 8\")  BMI (Calculated): 37.17    Neurological Examination:  Mental Status: The patient is alert and oriented to person, place, time, and situation. Speech is fluent, with no aphasia nor dysarthria noted. Affect is normal.    Cranial Nerve Examination:  CN I: Olfaction examination is deferred.  CN II: Visual fields are full to confrontation examination and show no visual field defect.   CN III, IV, VI: Eye movements are normal in all directions. Pupils are reactive to direct and consensual light. There is no relative afferent pupillary defect. There is no nystagmus.  CN V: Facial sensation to light touch is intact throughout.   CN VII: No significant facial muscle or other soft tissue asymmetry.  CN VIII: Hearing intact to rubbing sounds bilaterally.   CN IX, X: Soft palate elevates symmetrically.  CN XI: Symmetrical shoulder shrug exam.  CN XII: Midline tongue protrusion and moves symmetrically to each side.     Motor Examination:  Muscle strength is intact (5/5) throughout. Muscle tone is normal throughout. No abnormal movements are observed. No pronator drift is noted.     Muscle Stretch Reflexes Examination:  Muscle stretch reflexes are normal (2+) throughout and symmetric.    Sensory Examination:  Preserved sensation to light touch in all extremities.     Coordination:  Normal finger to nose testing bilaterally, no postural nor intentional tremor was noted. "     Stance/gait:  Normal regular gait with normal arm swings and stride length. Mild difficulties with tandem gait. Romberg sign is positive.       ASSESSMENT AND PLAN:  1. Migraine with aura and without status migrainosus, not intractable  Clinical presentation is consistent with migraine with aura.    We will initiate headache preventative treatment.  We discussed different options and agreed to proceed with Topamax.  Adverse effects were discussed in detail.  The patient is aware that she must not get pregnant while on Topamax.  The patient verbalized understanding and agreement.  - topiramate (TOPAMAX) 50 MG tablet; Take 1 Tablet by mouth every day.  Dispense: 30 Tablet; Refill: 4  - the patient was advised to take magnesium and riboflavin for headache prevention.    We will continue current acute headache treatment, since it has been effective:  - SUMAtriptan (IMITREX) 25 MG Tab tablet; Take 1 Tablet by mouth as needed for Migraine.  Dispense: 10 Tablet; Refill: 3    2. Schwannoma  The patient is getting regular imaging through her neuro otologist.  I advised the patient that searing sensation on the left side of her face, as well as burning sensation around her left eye and left upper lip, is likely related to schwannoma.  She will discuss this further with her neuro otologist, since there were changes in this pain since the last MRI/visit with neuro otologist.    3. Positive Romberg sign.  Differential diagnosis is broad.  - the patient was advised to follow-up with PCP to get basic lab work for myelopathy/neuropathy.  - Romberg positivity can be seen with schwannoma, but is not as common finding.  - we will consider spine imaging in the future as indicated.   - advised on acute myelopathy signs.     Patient Instructions   Please keep the diary of your headaches.    Please keep the diary of provoking factors for your headaches (e.g. alcohol, certain foods, etc).     Please take riboflavin (vitamin B2) 400 mg  daily and magnesium oxide 400 mg daily for headache prevention (over-the-counter).    Please take Topamax 25 mg daily (before sleep) for 2 weeks and then increase to 50 mg daily (before sleep) and continue this dose).     Please let our office know if you experience any changes in your neurological status and/or any changes in the nature of your headaches.     Please note that you must not get pregnant while taking headache preventative medications.     Please seek emergent medical attention if you experience any new/worsening neck/back pain, weakness/numbness, bladder/bowel incontinence, balance/walking difficulties, and/or any new/worsening neurological problem(s).     Please ask your primary care physician to check your vitamin B12, folate, TSH, CBC, CMP, copper, and zinc.     Follow up in 2 months.         Bryce Schwarz MD  Outpatient Neurology   [facility]

## 2023-12-12 NOTE — TELEPHONE ENCOUNTER
Received Refill PA request via [employer]  for Sumatriptan (Imitrex) 25 MG Tabs. (Quantity:9, Day Supply:30) Copay $6.79  Max qty 9 Max DS 30     Insurance: Joe  Member ID:  [id number]  BIN: [id number]  PCN: [id number]  Group: [id number]     Ran Test claim via [place] [facility] medication Pays for a $6.79 copay. Will outreach to patient to offer specialty pharmacy services and or release to preferred pharmacy

## 2023-12-13 ENCOUNTER — TELEPHONE (OUTPATIENT)
Dept: NEUROLOGY | Facility: MEDICAL CENTER | Age: 33
End: 2023-12-13
Payer: COMMERCIAL

## 2023-12-13 NOTE — TELEPHONE ENCOUNTER
VOICEMAIL  1. Caller Name: Tari                      Call Back Number: [phone]    2. Message: Tari called because she went to the pharmacy to  her prescription that she was given by the provider and it was not there.     3. Patient approves office to leave a detailed voicemail/MyChart message: N\A    I CALLED THE PHARMACY AND THEY HAVE THE PRESCRIPTION AND SAID THEY ARE WORKING ON IT. THEY WILL HAVE IT READY FOR  LATER TODAY. WHEN I SPOKE TO PATIENT SHE SAID SHE WOULD BE STOPPING IN LATER THIS EVENING TO PICK IT UP.

## 2024-01-02 ENCOUNTER — PATIENT MESSAGE (OUTPATIENT)
Dept: NEUROLOGY | Facility: MEDICAL CENTER | Age: 34
End: 2024-01-02
Payer: COMMERCIAL

## 2024-02-20 ENCOUNTER — TELEPHONE (OUTPATIENT)
Dept: NEUROLOGY | Facility: MEDICAL CENTER | Age: 34
End: 2024-02-20

## 2024-02-20 ENCOUNTER — OFFICE VISIT (OUTPATIENT)
Dept: NEUROLOGY | Facility: MEDICAL CENTER | Age: 34
End: 2024-02-20
Attending: PSYCHIATRY & NEUROLOGY
Payer: COMMERCIAL

## 2024-02-20 VITALS
HEIGHT: 68 IN | BODY MASS INDEX: 35.85 KG/M2 | DIASTOLIC BLOOD PRESSURE: 68 MMHG | WEIGHT: 236.55 LBS | SYSTOLIC BLOOD PRESSURE: 110 MMHG | TEMPERATURE: 98.7 F | HEART RATE: 74 BPM | OXYGEN SATURATION: 96 % | RESPIRATION RATE: 16 BRPM

## 2024-02-20 DIAGNOSIS — G43.109 MIGRAINE WITH AURA AND WITHOUT STATUS MIGRAINOSUS, NOT INTRACTABLE: ICD-10-CM

## 2024-02-20 PROCEDURE — 3074F SYST BP LT 130 MM HG: CPT | Performed by: PSYCHIATRY & NEUROLOGY

## 2024-02-20 PROCEDURE — 99214 OFFICE O/P EST MOD 30 MIN: CPT | Performed by: PSYCHIATRY & NEUROLOGY

## 2024-02-20 PROCEDURE — 3078F DIAST BP <80 MM HG: CPT | Performed by: PSYCHIATRY & NEUROLOGY

## 2024-02-20 PROCEDURE — 99212 OFFICE O/P EST SF 10 MIN: CPT | Performed by: PSYCHIATRY & NEUROLOGY

## 2024-02-20 RX ORDER — CARBAMAZEPINE 100 MG/1
100 TABLET, CHEWABLE ORAL 3 TIMES DAILY
Qty: 90 TABLET | Refills: 5 | Status: SHIPPED | OUTPATIENT
Start: 2024-02-20

## 2024-02-20 RX ORDER — TOPIRAMATE 50 MG/1
50 TABLET, FILM COATED ORAL DAILY
Qty: 90 TABLET | Refills: 1 | Status: SHIPPED | OUTPATIENT
Start: 2024-02-20

## 2024-02-20 ASSESSMENT — PATIENT HEALTH QUESTIONNAIRE - PHQ9: CLINICAL INTERPRETATION OF PHQ2 SCORE: 0

## 2024-02-20 NOTE — PROGRESS NOTES
[facility] Headache Program  Follow Up Visit      Patient's Name: Tari Pfeiffer  YOB: 1990  MRN: [id number]  Date of Service: 02/20/24    Referring Provider: No referring provider defined for this encounter.    Chief Concern: Headaches.     The patient presents for a follow up viist. The patient presents with the significant other and provides verbal consent for him to be present and provide additional history as necessary.     HPI (as obtained at the time of the initial visit and updated as necessary): The patient is a 33 y.o., right-handed female, who initially presented to my headache clinic for evaluation of headaches on 12/12/2023.    The patient experienced several migraine headaches in a row during her 20s.  These headaches would start with visual changes, described as black spots while looking at the screen, followed by a headache.  After the cluster of headaches, she had none since early to mid 2023.    In early to mid 2023, the patient started experiencing sensation that her eyes want to go back, strange feeling around her head, along with nausea and dizziness, followed by bitemporal/frontal tension headache with movement making it worse.  She has 6-8 of these headaches per month.  Sumatriptan helps these headaches.    She has another type of head pain, which is described as left-sided facial/head pain, described as searing/hot burning pain, that started in 2023 as well, and led to the diagnosis of schwannoma.  This pain lasted for several hours.  In addition she now has daily burning sensation that comes and goes, typically last around 2 hours, and is described as burning sensation around her left upper lip, and around her left eye.  Sumatriptan does not help these headaches.    The patient also reports that since 2019 she has difficulties with balance and her eyes are closed.  She had no falls.  She has no bowel or bladder incontinence.  She has some musculoskeletal neck pain,  "which gets better with chiropractic treatment.    Pertinent Ancillary Test Results:    MRI brain studies:   - MRI brain wo/w contrast (09/05/2023 at [facility] protocol): \"IMPRESSION: 1.  Left acoustic neuroma with morphological characteristics as described above. 2.  A few punctate nonspecific foci of white matter T2 hyperintensities likely representing nonspecific foci of gliosis of no clinical significance.\"    INTERIM HISTORY (02/20/2024): The patient reports that her migraines are significantly improved since starting Topamax.  She had only 1 episode of migraine, in context of eating chocolate.  She has no adverse effects of Topamax.    Her balance remains the same.  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  Her vitamin B12 was borderline low, and she is getting appropriate supplementation orally.    Facial sensation changes/tingling/pain remain the same, if not worse.    Current Acute Headache Treatment Medications: Tylenol. Ibuprofen. Sumatriptan (works for her typical migraine, not searing headaches/burning sensation on the left side of her head).     Previously Acute Headache Treatment Medications: CBD (helped with sleep).     Current Headache Preventative Medications: Topamax 50 mg nightly.     Previously Headache Preventative Medications: Gabapentin (made her feel worse).     Review of Systems: No recent fevers. She lost 8 lbs in context of dietary changes and using Topamax. The mood is overall stable. No SI/HI.     Past Medical History:  Migraine headache. Left schwannoma.     Past Surgical History:  None except wisdom teeth removal.      Social History:  Social History     Tobacco Use    Smoking status: Never    Smokeless tobacco: Never   Vaping Use    Vaping Use: Never used   Substance Use Topics    Alcohol use: Not Currently    Drug use: Never     Family " "History:  Her sister has migraines.   Family History   Problem Relation Age of Onset    Hyperlipidemia Mother     Hypertension Mother     Hyperlipidemia Father     Hypertension Father          2/20/2024    10:40 AM 12/12/2023     8:00 AM   PHQ-9 Screening   Little interest or pleasure in doing things 0 - not at all 0 - not at all   Feeling down, depressed, or hopeless 0 - not at all 0 - not at all   PHQ-2 Total Score 0 0     [place] Suicide Reassessment  New or continued thoughts about killing self?: No  Preparing to end life?: No    Allergies:  Allergies   Allergen Reactions    Penicillamine     Penicillins      RASH    Cefaclor Rash     RASH  RASH      Codeine Hives and Rash     RASH  RASH       Current Medications:    Current Outpatient Medications:     acetaminophen, Take 1,000 mg by mouth., PRN    ibuprofen, Take 600 mg by mouth., PRN    ondansetron, 4 mg, Oral, Q6HRS PRN, PRN    topiramate, 50 mg, Oral, DAILY, Taking    SUMAtriptan, 25 mg, Oral, PRN, PRN    etonogestrel, Inject 1 Each under the skin.    Physical Examination:    Ambulatory Vitals  Encounter Vitals  Temperature: 37.1 °C (98.7 °F)  Temp src: Temporal  Blood Pressure: 110/68  Pulse: 74  Respiration: 16  Pulse Oximetry: 96 %  Weight: 107 kg (236 lb 8.9 oz)  Height: 172.7 cm (5' 8\")  BMI (Calculated): 35.97    Neurological Examination:  Mental Status: The patient is alert and oriented to person, place, time, and situation. Speech is fluent, with no aphasia nor dysarthria noted. Affect is normal.    Cranial Nerve Examination:  CN I: Olfaction examination is deferred.  CN II: Visual fields are full to confrontation examination and show no visual field defect.   CN III, IV, VI: Eye movements are normal in all directions. Pupils are reactive to direct and consensual light. There is no relative afferent pupillary defect. There is no nystagmus.  CN V: Facial sensation to light touch is intact throughout.   CN VII: No significant facial muscle or other " soft tissue asymmetry.  CN VIII: Hearing intact to rubbing sounds bilaterally.   CN IX, X: Soft palate elevates symmetrically.  CN XI: Symmetrical shoulder shrug exam.  CN XII: Midline tongue protrusion and moves symmetrically to each side.     Motor Examination:  Muscle strength is intact (5/5) throughout. Muscle tone is normal throughout. No abnormal movements are observed. No pronator drift is noted.     Muscle Stretch Reflexes Examination:  Muscle stretch reflexes are normal (2+) throughout and symmetric.    Sensory Examination:  Preserved sensation to light touch in all extremities.     Coordination:  Normal finger to nose testing bilaterally, no postural nor intentional tremor was noted.     Stance/gait:  Normal regular gait with normal arm swings and stride length. Mild difficulties with tandem gait. Romberg sign is positive.       ASSESSMENT AND PLAN:  1. Migraine with aura and without status migrainosus, not intractable  Clinical presentation is consistent with migraine with aura.    We will continue current migraine preventative treatment with Topamax, since it has been effective.     - topiramate (TOPAMAX) 50 MG tablet; Take 1 Tablet by mouth every day.  Dispense: 90 Tablet; Refill: 1   - the patient was advised to take magnesium and riboflavin for headache prevention.    We will continue current acute headache treatment, since it has been effective:  - SUMAtriptan (IMITREX) 25 MG Tab tablet; Take 1 Tablet by mouth as needed for Migraine.  Dispense: 10 Tablet; Refill: 3    2. Schwannoma  The patient is getting regular imaging through her neuro otologist.  I advised the patient that searing sensation on the left side of her face, as well as burning sensation around her left eye and left upper lip, is likely related to schwannoma.  She will discuss this further with her neuro otologist, since there were changes in this pain since the last MRI/visit with neuro otologist.   - the patient will follow closely  with neuro otologist, and it seems that MRI brain is planned to be obtained soon.  I advised the patient that most of her symptoms outside of headache are likely related to her schwannoma.   - we discussed symptomatic treatment for trigeminal nerve pain, and we agreed to try carbamazepine.  We discussed adverse effects in detail.  - carBAMazepine (TEGRETOL) 100 MG Chew Tab; Chew 1 Tablet 3 times a day.  Dispense: 90 Tablet; Refill: 5    - her primary care physician will obtain follow-up labs in about a month.    3. Positive Romberg sign.  Differential diagnosis is broad.  - we we will reevaluate this problem after vestibular schwannoma is removed, and she continues supplementation with B12 vitamin.  - advised on acute myelopathy signs.     Follow up in 5 months.     Bryce Schwarz MD  Outpatient Neurology   [facility]

## 2024-02-20 NOTE — TELEPHONE ENCOUNTER
Topiramate (Topamax) 50 MG Tabs    RTS - NEXT RFL [id number] DAYS TO RFL 37 LAST FILL [id number] VIA RETAIL [id number]  + - 02/20/2024 1:07pm [facility]

## 2024-02-20 NOTE — PATIENT INSTRUCTIONS
Please keep the diary of your headaches.    Please keep the diary of provoking factors for your headaches (e.g. alcohol, certain foods, etc).     Please take riboflavin (vitamin B2) 400 mg daily and magnesium oxide 400 mg daily for headache prevention (over-the-counter).    Please take Topamax 50 mg (before sleep) every night.    Take carbamazepine 100 mg three times daily. Please note that it may worsen your balance problems.       Please let our office know if you experience any changes in your neurological status and/or any changes in the nature of your headaches.     Please note that you must not get pregnant while taking headache preventative medications.     Please seek emergent medical attention if you experience any new/worsening neck/back pain, weakness/numbness, bladder/bowel incontinence, balance/walking difficulties, and/or any new/worsening neurological problem(s).     Please ask your PCP to obtain CBC and CMP in mid March 2024.

## 2024-04-11 ENCOUNTER — HOSPITAL ENCOUNTER (OUTPATIENT)
Dept: RADIOLOGY | Facility: MEDICAL CENTER | Age: 34
End: 2024-04-11
Payer: COMMERCIAL

## 2024-04-11 DIAGNOSIS — D33.3 LEFT ACOUSTIC NEUROMA (HCC): ICD-10-CM

## 2024-04-11 PROCEDURE — A9579 GAD-BASE MR CONTRAST NOS,1ML: HCPCS

## 2024-04-11 PROCEDURE — 700117 HCHG RX CONTRAST REV CODE 255

## 2024-04-11 PROCEDURE — 70553 MRI BRAIN STEM W/O & W/DYE: CPT

## 2024-04-11 RX ADMIN — GADOTERIDOL 20 ML: 279.3 INJECTION, SOLUTION INTRAVENOUS at 08:59

## 2024-06-25 ENCOUNTER — TELEPHONE (OUTPATIENT)
Dept: NEUROLOGY | Facility: MEDICAL CENTER | Age: 34
End: 2024-06-25
Payer: COMMERCIAL

## 2024-07-09 ENCOUNTER — OFFICE VISIT (OUTPATIENT)
Dept: NEUROLOGY | Facility: MEDICAL CENTER | Age: 34
End: 2024-07-09
Attending: PSYCHIATRY & NEUROLOGY
Payer: COMMERCIAL

## 2024-07-09 ENCOUNTER — TELEPHONE (OUTPATIENT)
Dept: PHARMACY | Facility: MEDICAL CENTER | Age: 34
End: 2024-07-09

## 2024-07-09 VITALS
WEIGHT: 251.6 LBS | RESPIRATION RATE: 16 BRPM | HEIGHT: 68 IN | HEART RATE: 62 BPM | DIASTOLIC BLOOD PRESSURE: 82 MMHG | OXYGEN SATURATION: 97 % | SYSTOLIC BLOOD PRESSURE: 102 MMHG | TEMPERATURE: 97.9 F | BODY MASS INDEX: 38.13 KG/M2

## 2024-07-09 DIAGNOSIS — G43.109 MIGRAINE WITH AURA AND WITHOUT STATUS MIGRAINOSUS, NOT INTRACTABLE: ICD-10-CM

## 2024-07-09 PROCEDURE — 3074F SYST BP LT 130 MM HG: CPT | Performed by: PSYCHIATRY & NEUROLOGY

## 2024-07-09 PROCEDURE — 3079F DIAST BP 80-89 MM HG: CPT | Performed by: PSYCHIATRY & NEUROLOGY

## 2024-07-09 PROCEDURE — 99214 OFFICE O/P EST MOD 30 MIN: CPT | Performed by: PSYCHIATRY & NEUROLOGY

## 2024-07-09 PROCEDURE — 99211 OFF/OP EST MAY X REQ PHY/QHP: CPT | Performed by: PSYCHIATRY & NEUROLOGY

## 2024-07-09 RX ORDER — TOPIRAMATE 50 MG/1
50 TABLET, FILM COATED ORAL DAILY
Qty: 90 TABLET | Refills: 1 | Status: SHIPPED | OUTPATIENT
Start: 2024-07-09

## 2024-07-09 RX ORDER — SUMATRIPTAN 25 MG/1
25 TABLET, FILM COATED ORAL PRN
Qty: 10 TABLET | Refills: 3 | Status: SHIPPED | OUTPATIENT
Start: 2024-07-09

## 2024-07-09 ASSESSMENT — PATIENT HEALTH QUESTIONNAIRE - PHQ9: CLINICAL INTERPRETATION OF PHQ2 SCORE: 0

## 2024-12-03 ENCOUNTER — APPOINTMENT (OUTPATIENT)
Dept: NEUROLOGY | Facility: MEDICAL CENTER | Age: 34
End: 2024-12-03
Attending: PSYCHIATRY & NEUROLOGY
Payer: COMMERCIAL

## 2024-12-10 ENCOUNTER — OFFICE VISIT (OUTPATIENT)
Dept: NEUROLOGY | Facility: MEDICAL CENTER | Age: 34
End: 2024-12-10
Attending: PSYCHIATRY & NEUROLOGY
Payer: COMMERCIAL

## 2024-12-10 VITALS
WEIGHT: 261 LBS | BODY MASS INDEX: 39.56 KG/M2 | SYSTOLIC BLOOD PRESSURE: 108 MMHG | RESPIRATION RATE: 16 BRPM | HEART RATE: 79 BPM | TEMPERATURE: 97 F | HEIGHT: 68 IN | DIASTOLIC BLOOD PRESSURE: 64 MMHG | OXYGEN SATURATION: 97 %

## 2024-12-10 DIAGNOSIS — G43.109 MIGRAINE WITH AURA AND WITHOUT STATUS MIGRAINOSUS, NOT INTRACTABLE: ICD-10-CM

## 2024-12-10 PROCEDURE — 3078F DIAST BP <80 MM HG: CPT | Performed by: PSYCHIATRY & NEUROLOGY

## 2024-12-10 PROCEDURE — 99211 OFF/OP EST MAY X REQ PHY/QHP: CPT | Performed by: PSYCHIATRY & NEUROLOGY

## 2024-12-10 PROCEDURE — 99214 OFFICE O/P EST MOD 30 MIN: CPT | Performed by: PSYCHIATRY & NEUROLOGY

## 2024-12-10 PROCEDURE — 3074F SYST BP LT 130 MM HG: CPT | Performed by: PSYCHIATRY & NEUROLOGY

## 2024-12-10 RX ORDER — SUMATRIPTAN 50 MG/1
50 TABLET, FILM COATED ORAL PRN
Qty: 9 TABLET | Refills: 3 | Status: SHIPPED | OUTPATIENT
Start: 2024-12-10

## 2024-12-10 RX ORDER — TOPIRAMATE 50 MG/1
75 TABLET, FILM COATED ORAL DAILY
Qty: 135 TABLET | Refills: 1 | Status: SHIPPED | OUTPATIENT
Start: 2024-12-10

## 2024-12-10 ASSESSMENT — PATIENT HEALTH QUESTIONNAIRE - PHQ9: CLINICAL INTERPRETATION OF PHQ2 SCORE: 0

## 2024-12-10 NOTE — PATIENT INSTRUCTIONS
Please seek emergent medical attention if you experience any new/worsening neck/back pain, weakness/numbness, bladder/bowel incontinence, balance/walking difficulties, and/or any new/worsening neurological problem(s).     Please keep the diary of your headaches.    Please keep the diary of provoking factors for your headaches (e.g. alcohol, certain foods, etc).     Please take riboflavin (vitamin B2) 400 mg daily and magnesium oxide 400 mg daily for headache prevention (over-the-counter).    Please take Topamax 75 mg every night for headache prevention.    Please use sumatriptan as needed for headaches.     Please note that you MUST NOT become pregnant while on Topamax and sumatriptan.     Please let our office know if you experience any changes in your neurological status and/or any changes in the nature of your headaches.

## 2024-12-10 NOTE — PROGRESS NOTES
[facility]  Follow Up Visit      Patient's Name: Tari Pfeiffer  YOB: 1990  MRN: [id number]  Date of Service: 12/10/24    Referring Provider: No referring provider defined for this encounter.    Chief Concern: Headaches.     The patient presents for a follow up visit. The patient presents alone today.     HPI (as obtained at the time of the initial visit and updated as necessary): The patient is a 34 y.o., right-handed female, who initially presented to my headache clinic for evaluation of headaches on 12/12/2023.    The patient experienced several migraine headaches in a row during her 20s.  These headaches would start with visual changes, described as black spots while looking at the screen, followed by a headache.  After the cluster of headaches, she had none since early to mid 2023.    In early to mid 2023, the patient started experiencing sensation that her eyes want to go back, strange feeling around her head, along with nausea and dizziness, followed by bitemporal/frontal tension headache with movement making it worse.  She has 6-8 of these headaches per month.  Sumatriptan helps these headaches.    She has another type of head pain, which is described as left-sided facial/head pain, described as searing/hot burning pain, that started in 2023 as well, and led to the diagnosis of schwannoma.  This pain lasted for several hours.  In addition she now has daily burning sensation that comes and goes, typically last around 2 hours, and is described as burning sensation around her left upper lip, and around her left eye.  Sumatriptan does not help these headaches.    The patient also reports that since 2019 she has difficulties with balance and her eyes are closed.  She had no falls.  She has no bowel or bladder incontinence.  She has some musculoskeletal neck pain, which gets better with chiropractic treatment.    The patient had left vestibular schwannoma resection in May  "2024, and this was followed by a significant improvement in her balance, as well as facial trigeminal pain.    Pertinent Ancillary Test Results:    MRI brain studies:   - MRI brain wo/w contrast (09/05/2023 at [facility] - IAC protocol): \"IMPRESSION: 1.  Left acoustic neuroma with morphological characteristics as described above. 2.  A few punctate nonspecific foci of white matter T2 hyperintensities likely representing nonspecific foci of gliosis of no clinical significance.\"     - MRI brain IAC w/wo contrast (04/11/2024 at [facility]): \"Unchanged left vestibular schwannoma. Grossly similar nonspecific mildly prominent T2 hyperintense foci within the periventricular cerebral white matter. No acute intracranial abnormality or significant change.\"    CT head studies:   - CT head wo contrast (05/14/2024, [facility]): \"Postoperative changes of the left retrosigmoid craniotomy, acoustic neuroma resection, and fat grafting. Minimal hyperdense foci within the resection bed and along the left tentorium leaflet could reflect a small amount of blood products or hemostatic material. Small volume postoperative pneumocephalus. Otherwise, no acute intracranial hemorrhage, extra-axial collection, hydrocephalus, herniation, or large transcortical infarct. \"    INTERIM HISTORY (12/10/2024): The patient underwent surgical resection for left vestibular schwannoma on 5/14/2024.  Initially, after the surgery, she did quite well.  Unfortunately sometime in August 2024, she started experiencing episodes of electric-type pain on the left side of her face, burning sensation over the left half of the upper lip, and muscle twitching upon trying to smile, due to weakness.  These episodes would occur several times per month, and these episodes were occurring over 12 weeks following August 2024.  She had no further episodes of this type since about 3 weeks ago.    The patient did attempt to contact her surgeon, but did not hear back.  The " patient does have mild, residual, left-sided facial weakness.  It seems that this problem is overall on the mend.    The patient also noticed that her migraine headaches have increased in frequency and change in timing.  She has her stereotypical migraine headaches, described as severe, bitemporal, pounding, with photophobia and phonophobia, responsive to sumatriptan.  The difference is that these headaches come on quite quickly to a severe level, and are typically occurring in the early morning, waking her up from sleep between 2 and 3 AM.  The patient does note that these headaches are in nature very similar to her migraine headaches, just the timing is different; the patient also experienced headaches due to elevated intracranial pressure in the past, and the current headaches are quite different than those that she experienced in context of increased intracranial pressure.    The patient is taking Topamax regularly, and has no adverse effects from it.    Her balance remains improved at this time.     Current Acute Headache Treatment Medications: Tylenol. Ibuprofen. Sumatriptan (works for her typical migraine, not searing headaches/burning sensation on the left side of her head).     Previously Acute Headache Treatment Medications: CBD (helped with sleep).     Current Headache Preventative Medications: Topamax 50 mg nightly.     Previously Headache Preventative Medications: Gabapentin (made her feel worse).     Review of Systems: No recent fevers. She gained 10 lbs since the last visit (likely related to recent surgery). The mood is overall stable. No SI/HI.      Past Medical History:  Migraine headache. Left schwannoma.     Past Surgical History:  Left schwannoma surgical resection in May 2024.     Social History:  Social History     Tobacco Use    Smoking status: Never    Smokeless tobacco: Never   Vaping Use    Vaping status: Never Used   Substance Use Topics    Alcohol use: Not Currently    Drug use: Never  "    Family History:  Her sister has migraines.   Family History   Problem Relation Age of Onset    Hyperlipidemia Mother     Hypertension Mother     Hyperlipidemia Father     Hypertension Father          12/10/2024     3:00 PM 7/9/2024    10:40 AM 2/20/2024    10:40 AM 12/12/2023     8:00 AM   PHQ-9 Screening   Little interest or pleasure in doing things 0 - not at all 0 - not at all 0 - not at all 0 - not at all   Feeling down, depressed, or hopeless 0 - not at all 0 - not at all 0 - not at all 0 - not at all   PHQ-2 Total Score 0 0 0 0     [facility]worth Suicide Reassessment  New or continued thoughts about killing self?: No  Preparing to end life?: No    Allergies:  Allergies   Allergen Reactions    Penicillamine     Penicillins      RASH    Cefaclor Rash     RASH  RASH      Codeine Hives and Rash     RASH  RASH       Current Medications:    Current Outpatient Medications:     topiramate, 50 mg, Oral, DAILY, Taking    SUMAtriptan, 25 mg, Oral, PRN, PRN    acetaminophen, Take 1,000 mg by mouth., PRN    ibuprofen, Take 600 mg by mouth., PRN    ondansetron, 4 mg, Oral, Q6HRS PRN, PRN    Physical Examination:    Ambulatory Vitals  Encounter Vitals  Temperature: 36.1 °C (97 °F)  Temp src: Temporal  Blood Pressure: 108/64  Pulse: 79  Respiration: 16  Pulse Oximetry: 97 %  Weight: 118 kg (261 lb)  Height: 172.7 cm (5' 8\")  BMI (Calculated): 39.68    Neurological Examination:  Mental Status: The patient is alert and oriented to person, place, time, and situation. Speech is fluent, with no aphasia nor dysarthria noted. Affect is normal.    Cranial Nerve Examination:  CN I: Olfaction examination is deferred.  CN II: Visual fields are full to confrontation examination and show no visual field defect.   CN III, IV, VI: Eye movements are normal in all directions. Pupils are reactive to direct and consensual light. There is no relative afferent pupillary defect. There is no nystagmus.  CN V: Facial sensation to light touch is intact " throughout.   CN VII: Very mild, left lower facial weakness.   CN VIII: Hearing absent on the left, normal on the right.   CN IX, X: Soft palate elevates symmetrically.  CN XI: Symmetrical shoulder shrug exam.  CN XII: Midline tongue protrusion and moves symmetrically to each side.     Motor Examination:  Muscle strength is intact throughout. Muscle tone is normal throughout. No abnormal movements are observed. No pronator drift is noted.     Muscle Stretch Reflexes Examination:  Deferred.    Sensory Examination:  Preserved sensation to light touch in all extremities.     Coordination:  Normal finger to nose testing bilaterally, no postural nor intentional tremor was noted.     Stance/gait:  Normal regular gait with normal arm swings and stride length. Mild difficulties with tandem gait, but improved. Romberg sign is negative.     ASSESSMENT AND PLAN:  1. Migraine with aura and without status migrainosus, not intractable.  Clinical presentation is consistent with migraine with aura.    The patient had a recent change in timing, frequency, and severity of her headaches. In that context,  we will obtain MRI brain w/wo contrast, with special attention to IAC.  - MR-BRAIN IAC'S W/WO; Future    In context of her headaches worsening, we will increase Topamax to 75 mg daily. Adverse effects discussed.   - topiramate (TOPAMAX) 50 MG tablet; Take 1.5 Tablets by mouth every day.  Dispense: 135 Tablet; Refill: 1  - the patient should continue to take magnesium and riboflavin regularly.     The patient is to continue acute headache treatment with sumatriptan.   - SUMAtriptan (IMITREX) 50 MG Tab; Take 1 Tablet by mouth as needed for Migraine.  Dispense: 9 Tablet; Refill: 3    2. Schwannoma   The patient underwent resected surgery in May 2024.  - trigeminal nerve pain improved significantly initially, but then she had a bout of increased symptoms, as noted above, from August to November 2024.  - we will obtain MRI brain as noted  above.   - we will continue to follow this problem closely.  - we will defer reintroduction of carbamazepine at this time.    3. Positive Romberg sign - now resolved  Differential diagnosis is broad, but the patient balance improved after schwannoma resection in May 2024.   - the patient was advised in the past to continue vitamin B12 supplementation.   - we we will follow her balance clinically at this time.  - advised on acute myelopathy signs.     Patient Instructions   Please seek emergent medical attention if you experience any new/worsening neck/back pain, weakness/numbness, bladder/bowel incontinence, balance/walking difficulties, and/or any new/worsening neurological problem(s).     Please keep the diary of your headaches.    Please keep the diary of provoking factors for your headaches (e.g. alcohol, certain foods, etc).     Please take riboflavin (vitamin B2) 400 mg daily and magnesium oxide 400 mg daily for headache prevention (over-the-counter).    Please take Topamax 75 mg every night for headache prevention.    Please use sumatriptan as needed for headaches.     Please note that you MUST NOT become pregnant while on Topamax and sumatriptan.     Please let our office know if you experience any changes in your neurological status and/or any changes in the nature of your headaches.     Follow up in 1.5 months.     Bryce Schwarz MD  Outpatient Neurology   [facility]

## 2025-01-28 ENCOUNTER — APPOINTMENT (OUTPATIENT)
Dept: NEUROLOGY | Facility: MEDICAL CENTER | Age: 35
End: 2025-01-28
Attending: PSYCHIATRY & NEUROLOGY
Payer: COMMERCIAL

## 2025-02-10 ENCOUNTER — HOSPITAL ENCOUNTER (OUTPATIENT)
Dept: RADIOLOGY | Facility: MEDICAL CENTER | Age: 35
End: 2025-02-10
Attending: PSYCHIATRY & NEUROLOGY
Payer: COMMERCIAL

## 2025-02-10 DIAGNOSIS — G43.109 MIGRAINE WITH AURA AND WITHOUT STATUS MIGRAINOSUS, NOT INTRACTABLE: ICD-10-CM

## 2025-02-10 PROCEDURE — A9579 GAD-BASE MR CONTRAST NOS,1ML: HCPCS | Mod: JZ | Performed by: PSYCHIATRY & NEUROLOGY

## 2025-02-10 PROCEDURE — 70553 MRI BRAIN STEM W/O & W/DYE: CPT

## 2025-02-10 PROCEDURE — 700117 HCHG RX CONTRAST REV CODE 255: Mod: JZ | Performed by: PSYCHIATRY & NEUROLOGY

## 2025-02-10 RX ADMIN — GADOTERIDOL 20 ML: 279.3 INJECTION, SOLUTION INTRAVENOUS at 08:33

## 2025-02-11 ENCOUNTER — TELEPHONE (OUTPATIENT)
Dept: NEUROLOGY | Facility: MEDICAL CENTER | Age: 35
End: 2025-02-11
Payer: COMMERCIAL

## 2025-02-12 NOTE — TELEPHONE ENCOUNTER
Noted. The symptoms she is experiencing might be related to the inner ear problems on the left. The patient should reach out to her neuro-otologist (the provider that helped her with left acoustic neuroma) as soon as possible. She should let us know if she has difficulties with getting in touch with them. Thank you.   
Patient called back about MRI of brain. Patient stated that she has mild discomfort in the back of left ear as well the ear feeling full. Patient also stated that her balance is worse. Please advise   
Yes-Patient/Caregiver accepts free interpretation services...

## 2025-02-18 ENCOUNTER — OFFICE VISIT (OUTPATIENT)
Dept: NEUROLOGY | Facility: MEDICAL CENTER | Age: 35
End: 2025-02-18
Attending: PSYCHIATRY & NEUROLOGY
Payer: COMMERCIAL

## 2025-02-18 VITALS
TEMPERATURE: 98.1 F | HEART RATE: 66 BPM | WEIGHT: 246.91 LBS | BODY MASS INDEX: 37.42 KG/M2 | HEIGHT: 68 IN | DIASTOLIC BLOOD PRESSURE: 80 MMHG | RESPIRATION RATE: 12 BRPM | OXYGEN SATURATION: 97 % | SYSTOLIC BLOOD PRESSURE: 118 MMHG

## 2025-02-18 DIAGNOSIS — G43.109 MIGRAINE WITH AURA AND WITHOUT STATUS MIGRAINOSUS, NOT INTRACTABLE: ICD-10-CM

## 2025-02-18 DIAGNOSIS — D36.10 SCHWANNOMA: ICD-10-CM

## 2025-02-18 PROCEDURE — 99212 OFFICE O/P EST SF 10 MIN: CPT | Performed by: PSYCHIATRY & NEUROLOGY

## 2025-02-18 PROCEDURE — 3074F SYST BP LT 130 MM HG: CPT | Performed by: PSYCHIATRY & NEUROLOGY

## 2025-02-18 PROCEDURE — 99214 OFFICE O/P EST MOD 30 MIN: CPT | Performed by: PSYCHIATRY & NEUROLOGY

## 2025-02-18 PROCEDURE — 3079F DIAST BP 80-89 MM HG: CPT | Performed by: PSYCHIATRY & NEUROLOGY

## 2025-02-18 RX ORDER — TOPIRAMATE 50 MG/1
100 TABLET, FILM COATED ORAL DAILY
Qty: 180 TABLET | Refills: 1 | Status: SHIPPED | OUTPATIENT
Start: 2025-02-18

## 2025-02-18 RX ORDER — SUMATRIPTAN SUCCINATE 100 MG/1
100 TABLET ORAL PRN
Qty: 9 TABLET | Refills: 5 | Status: SHIPPED | OUTPATIENT
Start: 2025-02-18

## 2025-02-18 ASSESSMENT — PATIENT HEALTH QUESTIONNAIRE - PHQ9: CLINICAL INTERPRETATION OF PHQ2 SCORE: 0

## 2025-02-18 NOTE — PATIENT INSTRUCTIONS
Please seek emergent medical attention if you experience any new/worsening neck/back pain, weakness/numbness, bladder/bowel incontinence, balance/walking difficulties, and/or any new/worsening neurological problem(s).     Please keep the diary of your headaches.    Please keep the diary of provoking factors for your headaches (e.g. alcohol, certain foods, etc).     Please take riboflavin (vitamin B2) 400 mg daily and magnesium oxide 400 mg daily for headache prevention (over-the-counter).    Please take Topamax 100 mg every night for headache prevention.    Please use sumatriptan as needed for headaches.     Please note that you MUST NOT become pregnant while on Topamax and sumatriptan.     Please let our office know if you experience any changes in your neurological status and/or any changes in the nature of your headaches.

## 2025-02-18 NOTE — PROGRESS NOTES
[facility] Headache Program  Follow Up Visit      Patient's Name: Tari Pfeiffer  YOB: 1990  MRN: [id number]  Date of Service: 02/18/25.    Referring Provider: No referring provider defined for this encounter.    Chief Concern: Headaches.     The patient presents for a follow up visit. The patient presents alone today.     HPI (as obtained at the time of the initial visit and updated as necessary): The patient is a 34 y.o., right-handed female, who initially presented to my headache clinic for evaluation of headaches on 12/12/2023.    The patient experienced several migraine headaches in a row during her 20s.  These headaches would start with visual changes, described as black spots while looking at the screen, followed by a headache.  After the cluster of headaches, she had none since early to mid 2023.    In early to mid 2023, the patient started experiencing sensation that her eyes want to go back, strange feeling around her head, along with nausea and dizziness, followed by bitemporal/frontal tension headache with movement making it worse.  She has 6-8 of these headaches per month.  Sumatriptan helps these headaches.    She has another type of head pain, which is described as left-sided facial/head pain, described as searing/hot burning pain, that started in 2023 as well, and led to the diagnosis of schwannoma.  This pain lasted for several hours.  In addition she now has daily burning sensation that comes and goes, typically last around 2 hours, and is described as burning sensation around her left upper lip, and around her left eye.  Sumatriptan does not help these headaches.    The patient also reports that since 2019 she has difficulties with balance and her eyes are closed.  She had no falls.  She has no bowel or bladder incontinence.  She has some musculoskeletal neck pain, which gets better with chiropractic treatment.    The patient had left vestibular schwannoma resection in May  "2024, and this was followed by a significant improvement in her balance, as well as facial trigeminal pain.    Pertinent Ancillary Test Results:    MRI brain studies:   - MRI brain wo/w contrast (09/05/2023 at [facility] - IAC protocol): \"IMPRESSION: 1.  Left acoustic neuroma with morphological characteristics as described above. 2.  A few punctate nonspecific foci of white matter T2 hyperintensities likely representing nonspecific foci of gliosis of no clinical significance.\"     - MRI brain IAC w/wo contrast (04/11/2024 at [facility]): \"Unchanged left vestibular schwannoma. Grossly similar nonspecific mildly prominent T2 hyperintense foci within the periventricular cerebral white matter. No acute intracranial abnormality or significant change.\"     - MRI brain IAC w/wo contrast (02/10/2025): \"IMPRESSION:   1. Postoperative changes status post resection of left vestibular schwannoma. Expansile IAC with cystic foci and distortion of the 7th and 8th cranial nerve complexes which are not well evaluated. There is mild enhancement along the posterior wall of uncertain etiology and could be postoperative versus small residual  2. There is probable nonspecific enhancement within the left cochlea. This is of uncertain etiology but cannot exclude labyrinthitis. Some intrinsic T1 hyperintense signal in the vestibule and semicircular canals  3. No acute intracranial abnormality.\"     CT head studies:   - CT head wo contrast (05/14/2024, [facility]): \"Postoperative changes of the left retrosigmoid craniotomy, acoustic neuroma resection, and fat grafting. Minimal hyperdense foci within the resection bed and along the left tentorium leaflet could reflect a small amount of blood products or hemostatic material. Small volume postoperative pneumocephalus. Otherwise, no acute intracranial hemorrhage, extra-axial collection, hydrocephalus, herniation, or large transcortical infarct. \"    INTERIM HISTORY (02/18/2025): The patient, as " noted before, underwent surgical resection for the left vestibular schwannoma on 5/14/2024.  Initially, she did well, but unfortunately, as of August 2024, she started experiencing episodes of pain on the left side of her face, as well as worsening headaches.      As of recently, she also experienced fullness in the left ear.    Her typical migraine headaches were overall better controlled, but with all the development as noted above, her headaches have worsened, and sumatriptan is not as helpful as it was in the past.    The patient is taking Topamax regularly, and has no adverse effects from it.    Her balance appears to be a bit worse as of recently.     Current Acute Headache Treatment Medications: Tylenol. Ibuprofen. Sumatriptan (works for her typical migraine, not searing headaches/burning sensation on the left side of her head).     Previously Acute Headache Treatment Medications: CBD (helped with sleep).     Current Headache Preventative Medications: Topamax 75 mg nightly.     Previously Headache Preventative Medications: Gabapentin (made her feel worse).     Review of Systems: No recent fevers. She lost 15 lbs since the last visit. The mood is overall stable. No SI/HI.      Past Medical History:  Migraine headache. Left schwannoma.     Past Surgical History:  Left schwannoma surgical resection in May 2024.     Social History:  Social History     Tobacco Use    Smoking status: Never    Smokeless tobacco: Never   Vaping Use    Vaping status: Never Used   Substance Use Topics    Alcohol use: Not Currently    Drug use: Never     Family History:  Her sister has migraines.   Family History   Problem Relation Age of Onset    Hyperlipidemia Mother     Hypertension Mother     Hyperlipidemia Father     Hypertension Father          2/18/2025     2:40 PM 12/10/2024     3:00 PM 7/9/2024    10:40 AM 2/20/2024    10:40 AM 12/12/2023     8:00 AM   PHQ-9 Screening   Little interest or pleasure in doing things 0 - not at all  "0 - not at all 0 - not at all 0 - not at all 0 - not at all   Feeling down, depressed, or hopeless 0 - not at all 0 - not at all 0 - not at all 0 - not at all 0 - not at all   PHQ-2 Total Score 0 0 0 0 0     Levy Suicide Reassessment  New or continued thoughts about killing self?: No  Preparing to end life?: No    Allergies:  Allergies   Allergen Reactions    Penicillamine     Penicillins      RASH    Cefaclor Rash     RASH  RASH      Codeine Hives and Rash     RASH  RASH       Current Medications:    Current Outpatient Medications:     topiramate, 75 mg, Oral, DAILY, Taking    SUMAtriptan, 50 mg, Oral, PRN, PRN    acetaminophen, Take 1,000 mg by mouth., PRN    ibuprofen, Take 600 mg by mouth., PRN    ondansetron, 4 mg, Oral, Q6HRS PRN, PRN    Physical Examination:    Ambulatory Vitals  Encounter Vitals  Temperature: 36.7 °C (98.1 °F)  Temp src: Temporal  Blood Pressure: 118/80  Pulse: 66  Respiration: 12  Pulse Oximetry: 97 %  Weight: 112 kg (246 lb 14.6 oz)  Height: 172.7 cm (5' 8\") (pt reported)  BMI (Calculated): 37.54    Neurological Examination:  Mental Status: The patient is alert and oriented to person, place, time, and situation. Speech is fluent, with no aphasia nor dysarthria noted. Affect is normal.    Cranial Nerve Examination:  CN I: Olfaction examination is deferred.  CN II: Visual fields are full to confrontation examination and show no visual field defect.   CN III, IV, VI: Eye movements are normal in all directions. Pupils are reactive to direct and consensual light. There is no relative afferent pupillary defect. There is no nystagmus.  CN V: Facial sensation to light touch is intact throughout.   CN VII: Very mild, left lower facial weakness.   CN VIII: Hearing absent on the left, normal on the right.   CN IX, X: Soft palate elevates symmetrically.  CN XI: Symmetrical shoulder shrug exam.  CN XII: Midline tongue protrusion and moves symmetrically to each side.     Motor Examination:  Muscle " strength is intact throughout. Muscle tone is normal throughout. No abnormal movements are observed. No pronator drift is noted.     Muscle Stretch Reflexes Examination:  Deferred.    Sensory Examination:  Preserved sensation to light touch in all extremities.     Coordination:  Normal finger to nose testing bilaterally, no postural nor intentional tremor was noted.     Stance/gait:  Normal regular gait with normal arm swings and stride length. Mild difficulties with tandem gait, but improved. Romberg sign is negative.     ASSESSMENT AND PLAN:  1. Migraine with aura and without status migrainosus, not intractable.  Clinical presentation is consistent with migraine with aura.    We discussed that the results of the most recent MRI brain done in February 2025 need to be discussed with ENT/her neuro otology surgeon as soon as possible.  The patient verbalized understanding and agreement.    We also discussed in detail that white matter changes seen on her MRI brain and likely related to the history of migraines, and not clinically significant at this time.    In context of her worsening migraines, we will increase Topamax to 100 mg daily.  Adverse effects were discussed.  - topiramate (TOPAMAX) 50 MG tablet; Take 2 Tablets by mouth every day.  Dispense: 180 Tablet; Refill: 1   - the patient should continue to take magnesium and riboflavin regularly.     The patient is to continue acute headache treatment with sumatriptan.   - sumatriptan (IMITREX) 100 MG tablet; Take 1 Tablet by mouth as needed for Migraine (No more than once per day.).  Dispense: 9 Tablet; Refill: 5    2. Schwannoma   The patient underwent resected surgery in May 2024.  - trigeminal nerve pain improved significantly initially, but then she had a bout of increased symptoms, as noted above, from August to November 2024. There was further worsening afterwards.   - MRI brain as above - she will contact her ENG/surgeon ASAP.  - we will continue to follow  this problem closely.  - we will defer reintroduction of carbamazepine at this time.    3. Positive Romberg sign - now resolved  Differential diagnosis is broad, but the patient balance improved after schwannoma resection in May 2024.   - the patient was advised in the past to continue vitamin B12 supplementation.   - we we will follow her balance clinically at this time.  - advised on acute myelopathy signs in the past.     Patient Instructions   Please seek emergent medical attention if you experience any new/worsening neck/back pain, weakness/numbness, bladder/bowel incontinence, balance/walking difficulties, and/or any new/worsening neurological problem(s).     Please keep the diary of your headaches.    Please keep the diary of provoking factors for your headaches (e.g. alcohol, certain foods, etc).     Please take riboflavin (vitamin B2) 400 mg daily and magnesium oxide 400 mg daily for headache prevention (over-the-counter).    Please take Topamax 100 mg every night for headache prevention.    Please use sumatriptan as needed for headaches.     Please note that you MUST NOT become pregnant while on Topamax and sumatriptan.     Please let our office know if you experience any changes in your neurological status and/or any changes in the nature of your headaches.     Follow up in 4 months.     Bryce Schwarz MD  Outpatient Neurology   [facility]